# Patient Record
(demographics unavailable — no encounter records)

---

## 2017-12-22 NOTE — PROGRESS NOTE-POST OPERATIVE
Post-Operative Progess Note


Surgeon (s)/Assistant (s)


Surgeon


SHARMILA COLUNGA MD


Assistant


n/a





Pre-Operative Diagnosis


Bilat Chroinc HARDIK





Post-Operative Diagnosis


same





Post-Op Procedure Note


Date of Procedure:  Dec 22, 2017


Name of Procedure Performed:  


bmt


Description & Findings


Description and Findings:





n/a


Anesthesia Type


mask


Estimated Blood Loss


minimal


Packing


none.


Specimen(s) collected/removed


none











SHARMILA COLUNGA MD Dec 22, 2017 7:14 am

## 2017-12-22 NOTE — PROGRESS NOTE-PRE OPERATIVE
Pre-Operative Progress Note


H&P Reviewed


The H&P was reviewed, patient examined and no changes noted.


Date Seen by Provider:  Dec 22, 2017


Time Seen by Provider:  06:30


Date H&P Reviewed:  Dec 22, 2017


Time H&P Reviewed:  06:30


Pre-Operative Diagnosis:  Bilat Chroinc HARDIK











SHARMILA COLUNGA MD Dec 22, 2017 6:59 am

## 2017-12-22 NOTE — XMS REPORT
Continuity of Care Document

 Created on: 2017



AILEEN SHANKS

External Reference #: [id number]

: 2016

Sex: Female



Demographics







 Address  [place]

[place]

 

 Home Phone  UNK

 

 Preferred Language  Unknown

 

 Marital Status  Unknown

 

 Alevism Affiliation  Unknown

 

 Race  White

 

 Ethnic Group  Unknown





Author







 Author  [facility]

 

 Organization  [facility]

 

 Address  [facility]

 [address] [place], [place] [id number]



 

 Phone  Unavailable







Support







 Name  Relationship  Address  Phone

 

 JAQUI SADLAÑA MD  Caregiver  [address] [place], [place] [id number]  Unavailable

 

 AILEEN SHANKS  Next Of Kin  UNK

UNK, UN  UNK  UNK







Insurance Providers







 Payer Name  Policy Number  Subscriber Name  Relationship

 

 Self Pay Insurance     Aileen Shanks  18 Self / Same As Patient







Advance Directives







 Directive  Response  Recorded Date/Time

 

 Advance Directives  No  17 4:04pm

 

 Living Will  No  17 4:04pm

 

 Health Care Proxy  No  17 4:04pm

 

 Power of  for Health Care  No  17 4:04pm

 

 Organ, Tissue, or Eye Donor  No  17 4:04pm

 

 Do you have a signed organ donor card?  No  17 4:04pm







Chief Complaint and Reason for Visit







 Chief Complaint  FEVER OF UNKNOWN ORIGIN

 

 Reason for Visit  Otitis media







Problems

Active Problems







 Medical Problem  Onset Date  Status

 

 Otitis media  Unknown  Acute







Medications

Current Home Medications







 Medication  Dose  Units  Route  Directions  Days/Qty  Instructions  Start Date

 

 Amoxicillin/Clavulanate Potassium 100 Ml  250  Mg  Oral  Every 8 Hours  10 
Days     17







Social History







 Social History Problem  Response  Recorded Date/Time

 

 Alcohol Use  none  2017 4:47pm







Hospital Discharge Instructions

No hospital discharge instructions.



Plan of Care







 Discharge Date  17 4:46pm

 

 Condition at Discharge  Stable

 

 Instructions/Education Provided  Otitis Media in Children (ED)

 

 Prescriptions  See Medication Section

 

 Additional Instructions/Education  Followup with primary care physician in one 
week







Functional Status







 Query  Response  Date Recorded

 

 Patient Behavior  Cooperative

  May 7, 2017 4:02pm







Allergies, Adverse Reactions, Alerts

No allergy information available.



Immunizations







 Name  Given  Type

 

 Hx Diphtheria, Pertussis, Tetanus Vaccination  Up To Date  Historical

 

 Hx Influenza Vaccination  Yes  Historical

 

 Hx Pneumococcal Vaccination  No  Historical







Vital Signs

Acute Vital Signs





 Vital  Response  Date/Time

 

 Temperature (Fahrenheit)  100.9 degrees F (97.6 - 99.5)  2017 4:52pm

 

 Temperature Source  Temporal Artery  2017 4:52pm

 

 Respiratory Rate  24 bpm (12 - 24)  2017 4:02pm

 

 Respiratory Rate (Infant 6wks-1yr)  22 bpm (20 - 40)  2017 4:52pm

 

 O2 Sat by Pulse Oximetry  98 % (90 - 100)  2017 4:52pm

 

 Oxygen Delivery Method     2017 4:52pm

 

 Height  1 ft 6 in   

 

 Weight  20 lb   

 

 Body Mass Index  44.0 kg/m^2   







Results

No known relevant diagnostic tests, laboratory data and/or discharge summary.



Procedures

No known history of procedures.



Encounters







 Encounter  Location  Arrival/Admit Date  Discharge/Depart Date  Attending 
Provider

 

 Registered Emergency Room  [place]  17 3:59pm     JAQUI SALDAÑA MD











 Recent Diagnosis

## 2017-12-22 NOTE — XMS REPORT
Continuity of Care Document

 Created on: 2017



AILEEN SHANKS

External Reference #: [id number]

: 2016

Sex: Female



Demographics







 Address  [address] [place] [address]

 

 Home Phone  [phone]

 

 Preferred Language  Unknown

 

 Marital Status  Never 

 

 Adventism Affiliation  Unknown

 

 Race  White

 

 Ethnic Group  Unknown





Author







 Author  [facility]

 

 Organization  [facility]

 

 Address  [facility]

 [address] [place] [address]  [id number]



 

 Phone  Unavailable







Support







 Name  Relationship  Address  Phone

 

 JAQUI SALDAÑA MD  Caregiver  [address] [place] [address]  [address]  Unavailable

 

 JOSE SHANKS  Next Of Kin  [address] [place]  [address]  [phone]







Insurance Providers







 Payer Name  Policy Number  Subscriber Name  Relationship

 

 [facility]  [id number]  Aileen Shanks  18 Self / Same As Patient







Advance Directives







 Directive  Response  Recorded Date/Time

 

 Advance Directives  No  17 4:04pm

 

 Living Will  No  17 4:04pm

 

 Health Care Proxy  No  17 8:39am

 

 Power of  for Health Care  No  17 4:04pm

 

 Organ, Tissue, or Eye Donor  No  17 4:04pm

 

 Do you have a signed organ donor card?  No  17 4:04pm







Chief Complaint and Reason for Visit







 Chief Complaint  SWALLOWED POSSIBLE TOXIC SUBST

 

 Reason for Visit  [id number]







Problems

Active Problems







 Medical Problem  Onset Date  Status

 

 Accidental drug ingestion  Unknown  Acute

 

 Otitis media  Unknown  Acute







Medications

Current Home Medications







 Medication  Dose  Units  Route  Directions  Days/Qty  Instructions  Start Date

 

 Amoxicillin/Clavulanate Potassium 100 Ml  250  Mg  Oral  Every 8 Hours  10 
Days     17







Social History







 Social History Problem  Response  Recorded Date/Time

 

 Smoking Status  Never smoker  2017 9:05am









 Query  Response  Start Date  Stop Date

 

 Smoking Status  Never smoker      







Hospital Discharge Instructions

No hospital discharge instructions.



Plan of Care







 Discharge Date  17 10:50am

 

 Disposition  01 HOME, residential,ASSISTED LIVING

 

 Condition at Discharge  Stable

 

 Instructions/Education Provided  Poison Proofing Your Home (ED)

 

 Prescriptions  See Medication Section

 

 Additional Instructions/Education  Return for any change in behavior, vomiting
, fever, weakness or 

any new complaint 







Functional Status







 Query  Response  Date Recorded

 

 Patient Behavior  Appropriate

  2017 8:40am







Allergies, Adverse Reactions, Alerts

No allergy information available.



Immunizations







 Name  Given  Type

 

 Hx Diphtheria, Pertussis, Tetanus Vaccination  Up To Date  Historical

 

 Hx Influenza Vaccination  No  Historical

 

 Hx Pneumococcal Vaccination  No  Historical







Vital Signs

Acute Vital Signs





 Vital  Response  Date/Time

 

 Temperature (Fahrenheit)  98.9 degrees F (97.6 - 99.5)  2017 8:40am

 

 Temperature Source  Rectal  2017 8:40am

 

 Respiratory Rate  24 bpm (12 - 24)  2017 4:02pm

 

 Respiratory Rate (Infant 6wks-1yr)  22 bpm (20 - 40)  2017 4:52pm

 

 Respiratory Rate (Toddler 1-3yrs)  30 bpm (20 - 40)  2017 10:30am

 

 O2 Sat by Pulse Oximetry  100 % (90 - 100)  2017 10:30am

 

 Oxygen Delivery Method     2017 10:30am

 

 Height  2 ft 6 in   

 

 Weight  22 lb   

 

 Body Mass Index  17.0 kg/m^2   







Results

No known relevant diagnostic tests, laboratory data and/or discharge summary.



Procedures

No known history of procedures.



Encounters







 Encounter  Location  Arrival/Admit Date  Discharge/Depart Date  Attending 
Provider

 

 Departed Emergency Room  [place]  17 8:38am  17 10:50am  
JAQUI SALDAÑA MD

 

 Departed Emergency Room  [place]  17 3:59pm  17 4:46pm  JAQUI SALDAÑA MD











 Recent Diagnosis